# Patient Record
Sex: FEMALE | Race: WHITE | Employment: FULL TIME | URBAN - METROPOLITAN AREA
[De-identification: names, ages, dates, MRNs, and addresses within clinical notes are randomized per-mention and may not be internally consistent; named-entity substitution may affect disease eponyms.]

---

## 2017-04-05 ENCOUNTER — HOSPITAL ENCOUNTER (OUTPATIENT)
Dept: MAMMOGRAPHY | Age: 57
Discharge: HOME | End: 2017-04-05
Attending: FAMILY MEDICINE
Payer: COMMERCIAL

## 2017-04-05 DIAGNOSIS — Z12.31 VISIT FOR SCREENING MAMMOGRAM: ICD-10-CM

## 2017-04-05 PROCEDURE — 77067 SCR MAMMO BI INCL CAD: CPT

## 2018-04-06 ENCOUNTER — HOSPITAL ENCOUNTER (OUTPATIENT)
Dept: MAMMOGRAPHY | Age: 58
Discharge: HOME | End: 2018-04-06
Attending: FAMILY MEDICINE
Payer: COMMERCIAL

## 2018-04-06 DIAGNOSIS — Z12.31 ENCOUNTER FOR SCREENING MAMMOGRAM FOR BREAST CANCER: ICD-10-CM

## 2018-04-06 PROCEDURE — 77067 SCR MAMMO BI INCL CAD: CPT

## 2018-06-21 ENCOUNTER — APPOINTMENT (OUTPATIENT)
Dept: ULTRASOUND IMAGING | Age: 58
End: 2018-06-21
Attending: EMERGENCY MEDICINE
Payer: COMMERCIAL

## 2018-06-21 ENCOUNTER — HOSPITAL ENCOUNTER (EMERGENCY)
Age: 58
Discharge: HOME | End: 2018-06-21
Attending: EMERGENCY MEDICINE
Payer: COMMERCIAL

## 2018-06-21 VITALS
DIASTOLIC BLOOD PRESSURE: 70 MMHG | OXYGEN SATURATION: 99 % | HEART RATE: 89 BPM | WEIGHT: 172 LBS | BODY MASS INDEX: 32.47 KG/M2 | TEMPERATURE: 98 F | SYSTOLIC BLOOD PRESSURE: 130 MMHG | RESPIRATION RATE: 16 BRPM | HEIGHT: 61 IN

## 2018-06-21 DIAGNOSIS — M79.631 RIGHT FOREARM PAIN: Primary | ICD-10-CM

## 2018-06-21 PROCEDURE — 99283 EMERGENCY DEPT VISIT LOW MDM: CPT | Performed by: EMERGENCY MEDICINE

## 2018-06-21 PROCEDURE — 93971 EXTREMITY STUDY: CPT

## 2018-06-21 NOTE — ED TRIAGE NOTES
Pt reports she wants to make sure she does not have a blood clot in her right arm. Pt reports hx of blood clots in each leg.  Radial pulse present in traige

## 2018-06-21 NOTE — ED NOTES
I have reviewed discharge instructions with the patient. The patient verbalized understanding. Patient left ED via Discharge Method: ambulatory to Home with family. Opportunity for questions and clarification provided. Patient given 0 scripts. To continue your aftercare when you leave the hospital, you may receive an automated call from our care team to check in on how you are doing. This is a free service and part of our promise to provide the best care and service to meet your aftercare needs.  If you have questions, or wish to unsubscribe from this service please call [phone]. Thank you for Choosing our [facility] Emergency Department.

## 2018-06-21 NOTE — DISCHARGE INSTRUCTIONS
Alternate 4 ibuprofen every 8 hours with 2 extra-strength tylenol every 6 hours       Lipoma: Care Instructions  Your Care Instructions  A lipoma is a growth of fat just below the skin. It may feel soft and rubbery. Lipomas can occur anywhere on the body. But they are most common on the torso, neck, upper thighs, upper arms, and armpits. A lipoma does not turn into cancer. Lipomas usually are not treated, because most of them don't hurt or cause problems. But your doctor may remove a lipoma if it is painful, gets infected, or bothers you. Follow-up care is a key part of your treatment and safety. Be sure to make and go to all appointments, and call your doctor if you are having problems. It's also a good idea to know your test results and keep a list of the medicines you take. How can you care for yourself at home? · Lipomas usually need no care at home. · If your doctor removes a lipoma, follow directions for taking care of the cut (incision). When should you call for help? Call your doctor now or seek immediate medical care if:  ? · You have signs of infection, such as:  ¨ Increased pain, swelling, warmth, or redness. ¨ Red streaks leading from the lipoma. ¨ Pus draining from the lipoma. ¨ A fever. ? Watch closely for changes in your health, and be sure to contact your doctor if:  ? · The lipoma is growing or changing. ? · You do not get better as expected. Where can you learn more? Go to http://lily-dustin.info/. Enter L924 in the search box to learn more about \"Lipoma: Care Instructions. \"  Current as of: October 13, 2016  Content Version: 11.4  © 5445-5555 [employer]. Care instructions adapted under license by [employer] (which disclaims liability or warranty for this information).  If you have questions about a medical condition or this instruction, always ask your healthcare professional. Matt Collazo disclaims any warranty or liability for your use of this information.

## 2018-06-21 NOTE — ED PROVIDER NOTES
Patient is a 62 y.o. female presenting with arm pain. The history is provided by the patient and the spouse. Arm Pain    This is a new problem. The current episode started more than 2 days ago. The problem occurs constantly. The problem has been gradually worsening. Pain location: left volar forearm. The quality of the pain is described as aching. The pain is mild. Pertinent negatives include no numbness, full range of motion, no stiffness, no tingling and no neck pain. The symptoms are aggravated by palpation. She has tried nothing for the symptoms. The treatment provided no relief. There has been no history of extremity trauma (Not that she recalls).         Past Medical History:   Diagnosis Date    Abnormal CXR 1/14/13    Allergic rhinitis     Dizziness     DVT [facility]deep venous thrombosis) ([facility]) 1976    LLE    Dyspnea     Fatigue     Frequency     GERD (gastroesophageal reflux disease)     controlled with med    Guillain Barré syndrome ([facility] [address] 08/22/2016    onset 1976    Hair loss     Hot flashes     Hyperlipidemia     Hypothyroidism     Insomnia     Irregular menstruation     Leg swelling     Lumbago     Neck pain     Night sweats     Obesity     ROME (obstructive sleep apnea)     pt says she has not been tested- dr says she has it    Osteopenia     Osteopenia     Other ill-defined conditions(799.89)     guillian mini    Pharyngitis     Plantar fasciitis     Postmenopausal disorder     Right lumbar radiculopathy     Sinusitis     Thromboembolus ([address] 2014    LLE    Tobacco abuse     URI (upper respiratory infection)     Venous stasis     Vitamin D deficiency     Vitamin D deficiency        Past Surgical History:   Procedure Laterality Date    BREAST SURGERY PROCEDURE UNLISTED Left     lumpectomy    COLONOSCOPY N/A 8/26/2016    COLONOSCOPY performed by Dee Collins [facility] at [facility] ENDOSCOPY    HX HEENT      tonsils    HX TRACHEOSTOMY      due to [place] Family History:   Problem Relation Age of Onset    Hypertension Other     Diabetes Other     Heart Disease Other     Kidney Disease Other     Stroke Other     High Cholesterol Other     Other Other      arthritis    Breast Cancer Mother [de-identified]    Hypertension Mother     Stroke Mother      \"several\"    Diabetes Mother      insulin dep    Colon Cancer Mother     Breast Cancer Maternal Aunt      in her 46s    Heart Disease Sister      on warfarin-eventually might need a pacemaker    Cancer Sister      lymphoma    Hypertension Sister     Breast Cancer Sister     Hypertension Sister        Social History     Social History    Marital status:      Spouse name: N/A    Number of children: N/A    Years of education: N/A     Occupational History    Not on file. Social History Main Topics    Smoking status: Former Smoker     Years: 35.00    Smokeless tobacco: Never Used      Comment: quit age 47    Alcohol use 3.6 oz/week     6 Cans of beer, 0 Standard drinks or equivalent per week      Comment: Drinks beer, amount not reviewed.  Drug use: No    Sexual activity: Not on file     Other Topics Concern    Not on file     Social History Narrative         ALLERGIES: Codeine; Flu vaccine 2011 (36 mos+)(pf); Nucynta [tapentadol]; and Topamax [topiramate]    Review of Systems   Constitutional: Negative for chills and fever. Musculoskeletal: Positive for myalgias. Negative for neck pain, neck stiffness and stiffness. Skin: Negative for color change and wound. Neurological: Negative for tingling, weakness and numbness. Hematological: Does not bruise/bleed easily. Vitals:    06/21/18 1643 06/21/18 1725   BP: (!) 146/91 128/78   Pulse: (!) 103    Resp: 16    Temp: 97.9 °F (36.6 °C)    SpO2: 97% 100%   Weight: 78 kg (172 lb)    Height: 5' 1\" (1.549 m)             Physical Exam   Constitutional: She is oriented to person, place, and time.  She appears well-developed and well-nourished. No distress. HENT:   Head: Normocephalic and atraumatic. Eyes: Conjunctivae and EOM are normal. Right eye exhibits no discharge. Left eye exhibits no discharge. Neck: Normal range of motion. Neck supple. Pulmonary/Chest: Effort normal. No respiratory distress. Musculoskeletal: Normal range of motion. She exhibits tenderness. Arms:  Neurological: She is alert and oriented to person, place, and time. She has normal strength. She exhibits normal muscle tone. cni 2-12 grossly  Nl gait,  Nl speech     Skin: Skin is warm and dry. No rash noted. She is not diaphoretic. Psychiatric: She has a normal mood and affect. Her behavior is normal.   Nursing note and vitals reviewed. MDM  Number of Diagnoses or Management Options  Diagnosis management comments: Medical decision making note:  Right arm pain with concern for DVT, patient has history of bilateral DVTs in the past though in the lower extremity. Perform Doppler ultrasound. This concludes the \"medical decision making note\" part of this emergency department visit note.           ED Course       Procedures

## 2018-08-20 PROBLEM — Z12.11 COLON CANCER SCREENING: Status: ACTIVE | Noted: 2018-08-20

## 2018-08-20 PROBLEM — F41.1 GENERALIZED ANXIETY DISORDER: Status: ACTIVE | Noted: 2018-05-16

## 2018-08-20 PROBLEM — Z86.718 HISTORY OF DVT (DEEP VEIN THROMBOSIS): Status: ACTIVE | Noted: 2018-08-20

## 2018-08-20 PROBLEM — Z80.0 FAMILY HISTORY OF COLON CANCER: Chronic | Status: ACTIVE | Noted: 2018-08-20

## 2018-08-20 PROBLEM — R60.9 PERIPHERAL EDEMA: Status: ACTIVE | Noted: 2018-08-20

## 2018-08-20 PROBLEM — K21.9 GASTROESOPHAGEAL REFLUX DISEASE WITHOUT ESOPHAGITIS: Status: ACTIVE | Noted: 2018-05-16

## 2018-08-20 PROBLEM — Z79.899 ENCOUNTER FOR LONG-TERM (CURRENT) USE OF MEDICATIONS: Chronic | Status: ACTIVE | Noted: 2018-08-20

## 2018-08-20 PROBLEM — Z80.0 FAMILY HISTORY OF COLON CANCER: Status: ACTIVE | Noted: 2018-08-20

## 2018-08-20 PROBLEM — R51.9 CHRONIC HEADACHE DISORDER: Status: ACTIVE | Noted: 2018-05-16

## 2018-08-20 PROBLEM — G89.29 CHRONIC HEADACHE DISORDER: Status: ACTIVE | Noted: 2018-05-16

## 2018-08-20 PROBLEM — Z79.899 ENCOUNTER FOR LONG-TERM (CURRENT) USE OF MEDICATIONS: Status: ACTIVE | Noted: 2018-08-20

## 2018-08-20 PROBLEM — L65.9 HAIR THINNING: Status: ACTIVE | Noted: 2018-08-20

## 2018-08-20 PROBLEM — N32.81 OVERACTIVE BLADDER: Status: ACTIVE | Noted: 2018-05-16

## 2018-08-20 PROBLEM — Z76.89 ESTABLISHING CARE WITH NEW DOCTOR, ENCOUNTER FOR: Status: ACTIVE | Noted: 2018-08-20

## 2018-08-20 PROBLEM — Z79.1 NSAID LONG-TERM USE: Status: ACTIVE | Noted: 2018-08-20

## 2019-04-08 PROBLEM — E55.9 VITAMIN D DEFICIENCY: Status: ACTIVE | Noted: 2019-04-08

## 2019-04-08 PROBLEM — E78.1 HIGH TRIGLYCERIDES: Status: ACTIVE | Noted: 2019-04-08

## 2019-04-08 PROBLEM — E66.9 OBESITY (BMI 30-39.9): Status: ACTIVE | Noted: 2019-04-08

## 2019-04-16 ENCOUNTER — HOSPITAL ENCOUNTER (OUTPATIENT)
Dept: MAMMOGRAPHY | Age: 59
Discharge: HOME | End: 2019-04-16
Attending: FAMILY MEDICINE
Payer: COMMERCIAL

## 2019-04-16 DIAGNOSIS — Z12.31 VISIT FOR SCREENING MAMMOGRAM: ICD-10-CM

## 2019-04-16 PROCEDURE — 77067 SCR MAMMO BI INCL CAD: CPT

## 2019-04-21 PROBLEM — G43.009 MIGRAINE WITHOUT AURA AND WITHOUT STATUS MIGRAINOSUS, NOT INTRACTABLE: Status: ACTIVE | Noted: 2019-04-21

## 2019-08-20 ENCOUNTER — HOSPITAL ENCOUNTER (OUTPATIENT)
Dept: GENERAL RADIOLOGY | Age: 59
Discharge: HOME | End: 2019-08-20
Payer: COMMERCIAL

## 2019-08-20 DIAGNOSIS — M54.2 NECK PAIN: ICD-10-CM

## 2019-08-20 PROCEDURE — 72040 X-RAY EXAM NECK SPINE 2-3 VW: CPT

## 2019-09-24 PROBLEM — Z12.11 COLON CANCER SCREENING: Status: RESOLVED | Noted: 2018-08-20 | Resolved: 2019-09-24

## 2020-08-26 ENCOUNTER — HOSPITAL ENCOUNTER (OUTPATIENT)
Dept: MAMMOGRAPHY | Age: 60
Discharge: HOME | End: 2020-08-26
Attending: FAMILY MEDICINE
Payer: COMMERCIAL

## 2020-08-26 DIAGNOSIS — Z12.31 VISIT FOR SCREENING MAMMOGRAM: ICD-10-CM

## 2020-08-26 PROCEDURE — 77063 BREAST TOMOSYNTHESIS BI: CPT

## 2021-10-11 ENCOUNTER — TRANSCRIBE ORDER (OUTPATIENT)
Dept: SCHEDULING | Age: 61
End: 2021-10-11

## 2021-10-11 DIAGNOSIS — Z12.31 SCREENING MAMMOGRAM FOR HIGH-RISK PATIENT: Primary | ICD-10-CM

## 2021-11-05 ENCOUNTER — HOSPITAL ENCOUNTER (OUTPATIENT)
Dept: MAMMOGRAPHY | Age: 61
Discharge: HOME | End: 2021-11-05
Attending: NURSE PRACTITIONER
Payer: COMMERCIAL

## 2021-11-05 DIAGNOSIS — Z12.31 SCREENING MAMMOGRAM FOR HIGH-RISK PATIENT: ICD-10-CM

## 2021-11-05 PROCEDURE — 77063 BREAST TOMOSYNTHESIS BI: CPT

## 2022-01-25 PROBLEM — Z80.0 FAMILY HISTORY OF COLON CANCER REQUIRING SCREENING COLONOSCOPY: Status: ACTIVE | Noted: 2022-01-25

## 2022-03-18 PROBLEM — E66.9 OBESITY (BMI 30-39.9): Status: ACTIVE | Noted: 2019-04-08

## 2022-03-18 PROBLEM — Z80.0 FAMILY HISTORY OF COLON CANCER REQUIRING SCREENING COLONOSCOPY: Status: ACTIVE | Noted: 2022-01-25

## 2022-03-18 PROBLEM — E78.1 HIGH TRIGLYCERIDES: Status: ACTIVE | Noted: 2019-04-08

## 2022-03-19 PROBLEM — K21.9 GASTROESOPHAGEAL REFLUX DISEASE WITHOUT ESOPHAGITIS: Status: ACTIVE | Noted: 2018-05-16

## 2022-03-19 PROBLEM — Z79.899 ENCOUNTER FOR LONG-TERM (CURRENT) USE OF MEDICATIONS: Status: ACTIVE | Noted: 2018-08-20

## 2022-03-19 PROBLEM — N32.81 OVERACTIVE BLADDER: Status: ACTIVE | Noted: 2018-05-16

## 2022-03-19 PROBLEM — L65.9 HAIR THINNING: Status: ACTIVE | Noted: 2018-08-20

## 2022-03-19 PROBLEM — F41.1 GENERALIZED ANXIETY DISORDER: Status: ACTIVE | Noted: 2018-05-16

## 2022-03-19 PROBLEM — Z79.1 NSAID LONG-TERM USE: Status: ACTIVE | Noted: 2018-08-20

## 2022-03-19 PROBLEM — Z76.89 ESTABLISHING CARE WITH NEW DOCTOR, ENCOUNTER FOR: Status: ACTIVE | Noted: 2018-08-20

## 2022-03-19 PROBLEM — E55.9 VITAMIN D DEFICIENCY: Status: ACTIVE | Noted: 2019-04-08

## 2022-03-20 PROBLEM — G43.009 MIGRAINE WITHOUT AURA AND WITHOUT STATUS MIGRAINOSUS, NOT INTRACTABLE: Status: ACTIVE | Noted: 2019-04-21

## 2022-03-20 PROBLEM — G89.29 CHRONIC HEADACHE DISORDER: Status: ACTIVE | Noted: 2018-05-16

## 2022-03-20 PROBLEM — Z86.718 HISTORY OF DVT (DEEP VEIN THROMBOSIS): Status: ACTIVE | Noted: 2018-08-20

## 2022-03-20 PROBLEM — Z80.0 FAMILY HISTORY OF COLON CANCER: Status: ACTIVE | Noted: 2018-08-20

## 2022-03-20 PROBLEM — R60.9 PERIPHERAL EDEMA: Status: ACTIVE | Noted: 2018-08-20

## 2022-03-20 PROBLEM — R51.9 CHRONIC HEADACHE DISORDER: Status: ACTIVE | Noted: 2018-05-16

## 2022-03-20 PROBLEM — R60.0 PERIPHERAL EDEMA: Status: ACTIVE | Noted: 2018-08-20

## 2022-06-06 ENCOUNTER — PREP FOR PROCEDURE (OUTPATIENT)
Dept: SURGERY | Age: 62
End: 2022-06-06

## 2022-06-06 RX ORDER — SODIUM CHLORIDE 9 MG/ML
INJECTION, SOLUTION INTRAVENOUS PRN
Status: CANCELLED | OUTPATIENT
Start: 2022-06-06

## 2022-06-06 RX ORDER — SODIUM CHLORIDE 0.9 % (FLUSH) 0.9 %
5-40 SYRINGE (ML) INJECTION EVERY 12 HOURS SCHEDULED
Status: CANCELLED | OUTPATIENT
Start: 2022-06-06

## 2022-06-06 RX ORDER — SODIUM CHLORIDE 0.9 % (FLUSH) 0.9 %
5-40 SYRINGE (ML) INJECTION PRN
Status: CANCELLED | OUTPATIENT
Start: 2022-06-06

## 2022-06-22 RX ORDER — DARIFENACIN HYDROBROMIDE 15 MG/1
TABLET, EXTENDED RELEASE ORAL
Qty: 90 TABLET | OUTPATIENT
Start: 2022-06-22

## 2022-06-27 RX ORDER — OMEPRAZOLE 20 MG/1
CAPSULE, DELAYED RELEASE ORAL
Qty: 30 CAPSULE | Refills: 0 | OUTPATIENT
Start: 2022-06-27

## 2022-06-27 RX ORDER — OMEPRAZOLE 20 MG/1
CAPSULE, DELAYED RELEASE ORAL
Qty: 90 CAPSULE | OUTPATIENT
Start: 2022-06-27

## 2022-06-27 RX ORDER — LEVOTHYROXINE SODIUM 0.1 MG/1
TABLET ORAL
Qty: 90 TABLET | OUTPATIENT
Start: 2022-06-27

## 2022-06-27 RX ORDER — LEVOTHYROXINE SODIUM 0.1 MG/1
100 TABLET ORAL
Qty: 30 TABLET | Refills: 0 | OUTPATIENT
Start: 2022-06-27

## 2022-07-01 RX ORDER — LEVOTHYROXINE SODIUM 137 UG/1
TABLET ORAL
COMMUNITY
Start: 2022-05-23

## 2022-07-01 RX ORDER — CLOTRIMAZOLE AND BETAMETHASONE DIPROPIONATE 10; .64 MG/G; MG/G
CREAM TOPICAL
COMMUNITY
Start: 2022-06-16

## 2022-07-01 NOTE — PERIOP NOTE
Dear Mrs. Mata,      Thank you for completing your phone assessment with me today. Here are your requested procedure instructions. Please call #[phone] with any questions/concerns. Your procedure is scheduled at [address] [facility], [place], [address]. Please arrive at [facility]; GI Lab (#[phone]) will call you on the business day before your surgery with your arrival time. If you have any questions on the day of procedure, please call the GI dept. at the telephone number above. Follow procedure instructions for nothing by mouth or colonoscopy prep received from the GI/Surgeon office. Please take these medications on the morning of the procedure with a small sip of water: xanax, ASA 81 mg, enablex, levothyroxine, omeprazole; . Please stop all vitamins/supplements 7 days prior to the procedure and stop all NSAIDS (ibuprofen, naproxen, aleve, motrin, advil) 5 days before your procedure. A responsible adult must drive you to the hospital, remain in the building during the procedure and you will need adult supervision for 24 hours after anesthesia. Please use a non-moisturizing soap the night before the procedure and on the morning of the procedure. Do NOT wear: make-up, nail polish, lotions, cologne, perfumes, powders or oil on your skin. All piercings/metal/jewelry must be removed prior to arrival.  If you wear contacts then you will need to bring a case to store them in or wear your glasses. Deodorant is acceptable with all EGDs and/or colonoscopies. Medication given during procedure may cause drowsiness for several hours, therefore, do not drive or operate machinery for remainder of the day. You may not drink alcohol on the day of your procedure, please resume regular diet and activity unless otherwise directed. You may experience abdominal distention for several hours that is relieved by the passage of gas.  Contact your physician if you have any of the following: fever or chills, severe abdominal pain or excessive amount of bleeding or a large amount when having a bowel movement. Occasional specks of blood with bowel movement would not be unusual.     Please leave all your valuables at home but be sure to bring your insurance card and ID on the day of surgery for registration/identification. Our Guide to Surgery with additional information can be found:  [email or web address]    Emailed to: [email or web address]

## 2022-07-01 NOTE — PERIOP NOTE
Patient verified name, , and procedure. Type: 1a; abbreviated assessment per anesthesia guidelines    Labs per anesthesia: None per protocol    Instructed pt that they will be notified the day before their procedure by the GI Lab for time of arrival [facility] for [place] cases. Arrival times should be called by 5 pm. If no phone is received the patient should contact their respective hospital. The GI lab telephone number is [phone] and ES Pre-op is [phone]. Follow diet and prep instructions per office including NPO status. If patient has NOT received instructions from office patient is advised to call surgeon office, verbalizes understanding. Bath or shower the night before and the am of surgery with non-mositurizing soap. No lotions, oils, powders, cologne on skin. No make up, eye make up or jewelry. Wear loose fitting comfortable, clean clothing. Must have adult present in building the entire time . Medications for the day of procedure: xanax, ASA 81 mg, enablex, levothyroxine, omeprazole;  patient to hold: lasix    The following discharge instructions reviewed with patient: medication given during procedure may cause drowsiness for several hours, therefore, do not drive or operate machinery for remainder of the day. You may not drink alcohol on the day of your procedure, please resume regular diet and activity unless otherwise directed. You may experience abdominal distention for several hours that is relieved by the passage of gas. Contact your physician if you have any of the following: fever or chills, severe abdominal pain or excessive amount of bleeding or a large amount when having a bowel movement.  Occasional specks of blood with bowel movement would not be unusual.

## 2022-07-05 ENCOUNTER — ANESTHESIA EVENT (OUTPATIENT)
Dept: ENDOSCOPY | Age: 62
End: 2022-07-05
Payer: COMMERCIAL

## 2022-07-05 RX ORDER — DEXTROSE MONOHYDRATE 50 MG/ML
100 INJECTION, SOLUTION INTRAVENOUS PRN
Status: CANCELLED | OUTPATIENT
Start: 2022-07-05

## 2022-07-05 RX ORDER — HYDROMORPHONE HYDROCHLORIDE 2 MG/ML
0.25 INJECTION, SOLUTION INTRAMUSCULAR; INTRAVENOUS; SUBCUTANEOUS EVERY 5 MIN PRN
Status: CANCELLED | OUTPATIENT
Start: 2022-07-05

## 2022-07-05 RX ORDER — OXYCODONE HYDROCHLORIDE 5 MG/1
10 TABLET ORAL PRN
Status: CANCELLED | OUTPATIENT
Start: 2022-07-05 | End: 2022-07-05

## 2022-07-05 RX ORDER — LABETALOL HYDROCHLORIDE 5 MG/ML
10 INJECTION, SOLUTION INTRAVENOUS
Status: CANCELLED | OUTPATIENT
Start: 2022-07-05

## 2022-07-05 RX ORDER — OXYCODONE HYDROCHLORIDE 5 MG/1
5 TABLET ORAL PRN
Status: CANCELLED | OUTPATIENT
Start: 2022-07-05 | End: 2022-07-05

## 2022-07-05 RX ORDER — HYDROMORPHONE HYDROCHLORIDE 2 MG/ML
0.5 INJECTION, SOLUTION INTRAMUSCULAR; INTRAVENOUS; SUBCUTANEOUS EVERY 5 MIN PRN
Status: CANCELLED | OUTPATIENT
Start: 2022-07-05

## 2022-07-05 RX ORDER — ONDANSETRON 2 MG/ML
4 INJECTION INTRAMUSCULAR; INTRAVENOUS
Status: CANCELLED | OUTPATIENT
Start: 2022-07-05 | End: 2022-07-05

## 2022-07-05 RX ORDER — GLUCAGON 1 MG/ML
1 KIT INJECTION PRN
Status: CANCELLED | OUTPATIENT
Start: 2022-07-05

## 2022-07-06 ENCOUNTER — ANESTHESIA (OUTPATIENT)
Dept: ENDOSCOPY | Age: 62
End: 2022-07-06
Payer: COMMERCIAL

## 2022-07-06 ENCOUNTER — HOSPITAL ENCOUNTER (OUTPATIENT)
Age: 62
Setting detail: OUTPATIENT SURGERY
Discharge: HOME | End: 2022-07-06
Attending: SURGERY | Admitting: SURGERY
Payer: COMMERCIAL

## 2022-07-06 VITALS
HEART RATE: 81 BPM | WEIGHT: 196 LBS | SYSTOLIC BLOOD PRESSURE: 141 MMHG | DIASTOLIC BLOOD PRESSURE: 79 MMHG | RESPIRATION RATE: 18 BRPM | OXYGEN SATURATION: 100 % | BODY MASS INDEX: 37 KG/M2 | HEIGHT: 61 IN | TEMPERATURE: 97.7 F

## 2022-07-06 PROCEDURE — 7100000010 HC PHASE II RECOVERY - FIRST 15 MIN: Performed by: SURGERY

## 2022-07-06 PROCEDURE — 3609027000 HC COLONOSCOPY: Performed by: SURGERY

## 2022-07-06 PROCEDURE — 2709999900 HC NON-CHARGEABLE SUPPLY: Performed by: SURGERY

## 2022-07-06 PROCEDURE — 2580000003 HC RX 258: Performed by: ANESTHESIOLOGY

## 2022-07-06 PROCEDURE — 2500000003 HC RX 250 WO HCPCS: Performed by: NURSE ANESTHETIST, CERTIFIED REGISTERED

## 2022-07-06 PROCEDURE — 3700000000 HC ANESTHESIA ATTENDED CARE: Performed by: SURGERY

## 2022-07-06 PROCEDURE — 45378 DIAGNOSTIC COLONOSCOPY: CPT | Performed by: SURGERY

## 2022-07-06 PROCEDURE — 7100000011 HC PHASE II RECOVERY - ADDTL 15 MIN: Performed by: SURGERY

## 2022-07-06 PROCEDURE — 3700000001 HC ADD 15 MINUTES (ANESTHESIA): Performed by: SURGERY

## 2022-07-06 PROCEDURE — 6360000002 HC RX W HCPCS: Performed by: NURSE ANESTHETIST, CERTIFIED REGISTERED

## 2022-07-06 RX ORDER — TRISODIUM CITRATE DIHYDRATE AND CITRIC ACID MONOHYDRATE 500; 334 MG/5ML; MG/5ML
30 SOLUTION ORAL
Status: DISCONTINUED | OUTPATIENT
Start: 2022-07-06 | End: 2022-07-06 | Stop reason: HOSPADM

## 2022-07-06 RX ORDER — LIDOCAINE HYDROCHLORIDE 20 MG/ML
INJECTION, SOLUTION EPIDURAL; INFILTRATION; INTRACAUDAL; PERINEURAL PRN
Status: DISCONTINUED | OUTPATIENT
Start: 2022-07-06 | End: 2022-07-06 | Stop reason: SDUPTHER

## 2022-07-06 RX ORDER — PROPOFOL 10 MG/ML
INJECTION, EMULSION INTRAVENOUS PRN
Status: DISCONTINUED | OUTPATIENT
Start: 2022-07-06 | End: 2022-07-06 | Stop reason: SDUPTHER

## 2022-07-06 RX ORDER — SODIUM CHLORIDE 0.9 % (FLUSH) 0.9 %
5-40 SYRINGE (ML) INJECTION PRN
Status: DISCONTINUED | OUTPATIENT
Start: 2022-07-06 | End: 2022-07-06 | Stop reason: HOSPADM

## 2022-07-06 RX ORDER — PROPOFOL 10 MG/ML
INJECTION, EMULSION INTRAVENOUS CONTINUOUS PRN
Status: DISCONTINUED | OUTPATIENT
Start: 2022-07-06 | End: 2022-07-06 | Stop reason: SDUPTHER

## 2022-07-06 RX ORDER — MIDAZOLAM HYDROCHLORIDE 2 MG/2ML
2 INJECTION, SOLUTION INTRAMUSCULAR; INTRAVENOUS
Status: DISCONTINUED | OUTPATIENT
Start: 2022-07-06 | End: 2022-07-06 | Stop reason: HOSPADM

## 2022-07-06 RX ORDER — SODIUM CHLORIDE, SODIUM LACTATE, POTASSIUM CHLORIDE, CALCIUM CHLORIDE 600; 310; 30; 20 MG/100ML; MG/100ML; MG/100ML; MG/100ML
INJECTION, SOLUTION INTRAVENOUS CONTINUOUS
Status: DISCONTINUED | OUTPATIENT
Start: 2022-07-06 | End: 2022-07-06 | Stop reason: HOSPADM

## 2022-07-06 RX ORDER — SODIUM CHLORIDE 0.9 % (FLUSH) 0.9 %
5-40 SYRINGE (ML) INJECTION EVERY 12 HOURS SCHEDULED
Status: DISCONTINUED | OUTPATIENT
Start: 2022-07-06 | End: 2022-07-06 | Stop reason: HOSPADM

## 2022-07-06 RX ORDER — LIDOCAINE HYDROCHLORIDE 10 MG/ML
1 INJECTION, SOLUTION EPIDURAL; INFILTRATION; INTRACAUDAL; PERINEURAL
Status: DISCONTINUED | OUTPATIENT
Start: 2022-07-06 | End: 2022-07-06 | Stop reason: HOSPADM

## 2022-07-06 RX ORDER — SODIUM CHLORIDE 9 MG/ML
INJECTION, SOLUTION INTRAVENOUS PRN
Status: DISCONTINUED | OUTPATIENT
Start: 2022-07-06 | End: 2022-07-06 | Stop reason: HOSPADM

## 2022-07-06 RX ADMIN — PROPOFOL 140 MCG/KG/MIN: 10 INJECTION, EMULSION INTRAVENOUS at 09:06

## 2022-07-06 RX ADMIN — PROPOFOL 50 MG: 10 INJECTION, EMULSION INTRAVENOUS at 09:06

## 2022-07-06 RX ADMIN — PROPOFOL 40 MG: 10 INJECTION, EMULSION INTRAVENOUS at 09:11

## 2022-07-06 RX ADMIN — SODIUM CHLORIDE, POTASSIUM CHLORIDE, SODIUM LACTATE AND CALCIUM CHLORIDE: 600; 310; 30; 20 INJECTION, SOLUTION INTRAVENOUS at 08:08

## 2022-07-06 RX ADMIN — LIDOCAINE HYDROCHLORIDE 40 MG: 20 INJECTION, SOLUTION EPIDURAL; INFILTRATION; INTRACAUDAL; PERINEURAL at 09:05

## 2022-07-06 ASSESSMENT — PAIN - FUNCTIONAL ASSESSMENT
PAIN_FUNCTIONAL_ASSESSMENT: 0-10
PAIN_FUNCTIONAL_ASSESSMENT: NONE - DENIES PAIN
PAIN_FUNCTIONAL_ASSESSMENT: 0-10
PAIN_FUNCTIONAL_ASSESSMENT: NONE - DENIES PAIN
PAIN_FUNCTIONAL_ASSESSMENT: NONE - DENIES PAIN

## 2022-07-06 NOTE — BRIEF OP NOTE
Brief Postoperative Note      Patient: Haile King  YOB: 1960  MRN: [id number]    Date of Procedure: 7/6/2022    Pre-Op Diagnosis: z12.11    Post-Op Diagnosis: Normal exam       Procedure(s):  COLORECTAL CANCER SCREENING, NOT HIGH RISK    Surgeon(s):  Alexandra Nair MD    Assistant:  Surgical Assistant: Yocasta Harris    Anesthesia: Monitor Anesthesia Care    Estimated Blood Loss (mL): Minimal    Complications: None    Specimens:   * No specimens in log *    Implants:  * No implants in log *      Drains: * No LDAs found *    Findings: as above    Electronically signed by Francesca Muñoz MD on 7/6/2022 at 9:28 AM

## 2022-07-06 NOTE — ANESTHESIA POSTPROCEDURE EVALUATION
Department of Anesthesiology  Postprocedure Note    Patient: Sadiq Davis  MRN: [id number]  YOB: 1960  Date of evaluation: 7/6/2022      Procedure Summary     Date: 07/06/22 Room / Location: [facility] ENDO 04 / [facility] ENDOSCOPY    Anesthesia Start: 4710 Anesthesia Stop: 0930    Procedure: COLORECTAL CANCER SCREENING, NOT HIGH RISK (N/A Lower GI Region) Diagnosis: (z12.11)    Surgeons: Josselin Rod MD Responsible Provider: Natalie Agustin MD    Anesthesia Type: TIVA ASA Status: 3          Anesthesia Type: No value filed.     Etvin Phase I: Tevin Score: 10    Tevin Phase II:        Anesthesia Post Evaluation    Patient location during evaluation: PACU  Patient participation: complete - patient participated  Level of consciousness: awake and alert  Airway patency: patent  Nausea & Vomiting: no nausea and no vomiting  Complications: no  Cardiovascular status: hemodynamically stable  Respiratory status: acceptable, nonlabored ventilation and spontaneous ventilation  Hydration status: euvolemic  Comments: /87   Pulse 80   Temp 97.7 °F (36.5 °C) (Skin)   Resp 18   Ht 5' 1\" (1.549 m)   Wt 196 lb (88.9 kg)   SpO2 99%   BMI 37.03 kg/m²     Multimodal analgesia pain management approach

## 2022-07-06 NOTE — OP NOTE
Operative Note      Patient: Navdeep Leong  YOB: 1960  MRN: [id number]    Date of Procedure: 7/6/2022    Pre-Op Diagnosis: z12.11    Post-Op Diagnosis: Normal exam       Procedure(s):  COLORECTAL CANCER SCREENING, NOT HIGH RISK    Surgeon(s):  Jules Veliz MD    Assistant:   Surgical Assistant: John Mckenna    Anesthesia: Monitor Anesthesia Care    Estimated Blood Loss (mL): Minimal    Complications: None    Specimens:   * No specimens in log *    Implants:  * No implants in log *      Drains: * No LDAs found *    Findings: as above    Detailed Description of Procedure:   Dictated  TOV#[id number]    Electronically signed by Sagar Hernandez MD on 7/6/2022 at 9:29 AM

## 2022-07-06 NOTE — ANESTHESIA PRE PROCEDURE
Department of Anesthesiology  Preprocedure Note       Name:  Lenny Simon   Age:  64 y.o.  :  1960                                          MRN:  [id number]         Date:  2022      Surgeon: Sunitha Robb):  Anthony Webb MD    Procedure: Procedure(s):  COLORECTAL CANCER SCREENING, NOT HIGH RISK    Medications prior to admission:   Prior to Admission medications    Medication Sig Start Date End Date Taking? Authorizing Provider   Multiple Vitamin (MULTIVITAMIN ADULT PO) Take by mouth    Historical Provider, MD   clotrimazole-betamethasone (LOTRISONE) 1-0.05 % cream  22   Historical Provider, MD   levothyroxine (SYNTHROID) 137 MCG tablet  22   Historical Provider, MD   ALPRAZolam Clarisse Close) 0.5 MG tablet Take 0.5-1 mg by mouth 3 times daily as needed.   20   Ar Automatic Reconciliation   aspirin 81 MG EC tablet Take 81 mg by mouth daily Preventative for blood clots in legs    Ar Automatic Reconciliation   atorvastatin (LIPITOR) 40 MG tablet Take 40 mg by mouth at bedtime  10/13/21   Ar Automatic Reconciliation   Calcium Carbonate Antacid 1000 MG CHEW Take 2 tablets by mouth as needed  17   Ar Automatic Reconciliation   Cholecalciferol 50 MCG (2000 UT) CAPS Take 2,000 Units by mouth daily 22   Ar Automatic Reconciliation   cyanocobalamin 1000 MCG tablet Take 1,000 mcg by mouth daily    Ar Automatic Reconciliation   cyclobenzaprine (FLEXERIL) 10 MG tablet Take 10 mg by mouth 2 times daily as needed  21   Ar Automatic Reconciliation   darifenacin (ENABLEX) 15 MG extended release tablet Take 15 mg by mouth daily 22   Ar Automatic Reconciliation   ergocalciferol (ERGOCALCIFEROL) 1.25 MG (06905 UT) capsule TAKE 1 CAPSULE BY MOUTH EVERY WEEK  Patient not taking: Reported on 2022   Ar Automatic Reconciliation   furosemide (LASIX) 20 MG tablet Take 1 tablet by mouth as needed     Ar Automatic Reconciliation   Lifitegrast (XIIDRA) 5 % SOLN INSTILL 1 DROP IN BOTH EYES TWICE DAILY 12 HOURS APART 8/31/21   Ar Automatic Reconciliation   loratadine (CLARITIN) 10 MG tablet Take 10 mg by mouth    Ar Automatic Reconciliation   omeprazole (PRILOSEC) 20 MG delayed release capsule TAKE ONE CAPSULE BY MOUTH EVERY MORNING 2/1/21   Ar Automatic Reconciliation   phentermine (ADIPEX-P) 37.5 MG tablet Take 37.5 mg by mouth. Patient not taking: Reported on 7/1/2022 2/1/21   Ar Automatic Reconciliation   topiramate (TOPAMAX) 50 MG tablet Take 50 mg by mouth 2 times daily  Patient not taking: Reported on 7/1/2022    Ar Automatic Reconciliation       Current medications:    No current facility-administered medications for this encounter. Allergies: Allergies   Allergen Reactions    Latex Hives    Codeine Hives and Nausea Only     Codeine Sulfate   Even taking phenergan as premedication it didn't help    Other Other (See Comments)     Pt has Guillain [place]    Tapentadol Nausea Only     Nucynta     Topiramate Other (See Comments)     Tingling all over       Problem List:    Patient Active Problem List   Diagnosis Code    High triglycerides E78.1    Obesity (BMI 30-39. 9) E66.9    HEATH (obstructive sleep apnea) G47.33    Hyperlipidemia E78.5    Allergic rhinitis J30.9    Insomnia G47.00    Family history of colon cancer requiring screening colonoscopy Z80.0    Neck pain M54.2    Hypothyroidism E03.9    Generalized anxiety disorder F41.1    History of Guillain-[place] syndrome Z86.69    Vitamin D deficiency E55.9    Overactive bladder N32.81    NSAID long-term use Z79.1    Hair thinning L65.9    Encounter for long-term (current) use of medications Z79.899    Chronic gastritis K29.50    Hot flashes R23.2    Gastroesophageal reflux disease without esophagitis K21.9    Osteopenia M85.80    GERD (gastroesophageal reflux disease) K21.9    Postmenopausal disorder N95.1    Right lumbar radiculopathy M54.16    Establishing care with new doctor, encounter for Z76.89   Jessa Peripheral edema R60.9    Migraine without aura and without status migrainosus, not intractable G43.009    Chronic headache disorder R51.9, G89.29    History of DVT (deep vein thrombosis) Z86.718    Family history of colon cancer Z80.0       Past Medical History:        Diagnosis Date    Abnormal CXR 2013    Allergic rhinitis     Arthritis     back and hands     Chronic pain     Depression     Dizziness     [facility]     LLE    Dyspnea     not right now 2022 per patient    Fatigue     Frequency     GERD (gastroesophageal reflux disease)     controlled with med    Guillain Barré syndrome ([address] 2016    onset     Hair loss     Headache     History of DVT (deep vein thrombosis) 2018    History of Guillain-Friend syndrome     was paralyzed x year; had to learn to walk again    Hot flashes     Hx of blood clots     Hyperlipidemia     Hypothyroidism     Insomnia     Irregular menstruation     Leg swelling     Lumbago     Neck pain     Night sweats     Obesity     HEATH (obstructive sleep apnea)     pt says she has not been tested- dr says she has it - no cpap machine used    Osteopenia     Osteopenia     Other ill-defined conditions(799.89)     guillian pooja    Pharyngitis     Plantar fasciitis     Postmenopausal disorder     Right lumbar radiculopathy     Sinusitis     Thromboembolus ([facility] [address]     LLE    Tobacco abuse     quit    URI (upper respiratory infection)     Venous stasis     Vitamin D deficiency        Past Surgical History:        Procedure Laterality Date    BREAST BIOPSY Left     BREAST SURGERY Left     lumpectomy    HEENT      tonsils    TRACHEOSTOMY      due to [address]         Social History:    Social History     Tobacco Use    Smoking status: Former Smoker     Quit date:      Years since quittin.5    Smokeless tobacco: Never Used    Tobacco comment: Quit smoking: quit age 47   Substance Use Topics    Alcohol use: Yes     Alcohol/week: 2.0 standard drinks     Types: 2 Cans of beer per week                                Counseling given: Not Answered  Comment: Quit smoking: quit age 47      Vital Signs (Current):   Vitals:    07/01/22 1051   Weight: 197 lb (89.4 kg)   Height: 5' 1\" (1.549 m)                                              BP Readings from Last 3 Encounters:   01/25/22 (!) 140/90   10/13/21 133/86       NPO Status:                                                                                 BMI:   Wt Readings from Last 3 Encounters:   07/01/22 197 lb (89.4 kg)   01/25/22 192 lb (87.1 kg)   10/13/21 192 lb 8 oz (87.3 kg)     Body mass index is 37.22 kg/m². CBC:   Lab Results   Component Value Date/Time    WBC 8.8 10/13/2021 02:11 PM    RBC 4.57 10/13/2021 02:11 PM    HGB 14.3 10/13/2021 02:11 PM    HCT 43.0 10/13/2021 02:11 PM    MCV 94 10/13/2021 02:11 PM    RDW 12.2 10/13/2021 02:11 PM     10/13/2021 02:11 PM       CMP:   Lab Results   Component Value Date/Time     10/13/2021 02:11 PM    K 4.9 10/13/2021 02:11 PM     10/13/2021 02:11 PM    CO2 25 10/13/2021 02:11 PM    BUN 11 10/13/2021 02:11 PM    CREATININE 0.69 10/13/2021 02:11 PM    GFRAA 109 10/13/2021 02:11 PM    AGRATIO 1.6 10/13/2021 02:11 PM    GLUCOSE 82 10/13/2021 02:11 PM    PROT 6.6 10/13/2021 02:11 PM    CALCIUM 9.4 10/13/2021 02:11 PM    BILITOT 0.3 10/13/2021 02:11 PM    ALKPHOS 118 10/13/2021 02:11 PM    AST 18 10/13/2021 02:11 PM    ALT 26 10/13/2021 02:11 PM       POC Tests: No results for input(s): POCGLU, POCNA, POCK, POCCL, POCBUN, POCHEMO, POCHCT in the last 72 hours.     Coags: No results found for: PROTIME, INR, APTT    HCG (If Applicable): No results found for: PREGTESTUR, PREGSERUM, HCG, HCGQUANT     ABGs: No results found for: PHART, PO2ART, DUD1IFW, MKV1GZD, BEART, A7ELAANZ     Type & Screen (If Applicable):  No results found for: LABABO, [address]    Drug/Infectious Status (If Applicable):  No results found for: HIV, HEPCAB    COVID-19 Screening (If Applicable): No results found for: COVID19        Anesthesia Evaluation  Patient summary reviewed and Nursing notes reviewed  Airway: Mallampati: II  TM distance: >3 FB   Neck ROM: full  Mouth opening: > = 3 FB   Dental: normal exam         Pulmonary: breath sounds clear to auscultation  (+) sleep apnea:                             Cardiovascular:  Exercise tolerance: good (>4 METS),   (+) hyperlipidemia        Rhythm: regular  Rate: normal                    Neuro/Psych:   (+) headaches: migraine headaches, depression/anxiety              ROS comment: History of Guillain-[place] syndrome - 1976    Right lumbar radiculopathy  GI/Hepatic/Renal:   (+) GERD:,           Endo/Other:    (+) hypothyroidism::., .                 Abdominal:             Vascular:   + DVT, . Other Findings:           Anesthesia Plan      TIVA     ASA 3       Induction: intravenous. Anesthetic plan and risks discussed with patient. Plan discussed with CRNA.     Attending anesthesiologist reviewed and agrees with Joshua Akhtar MD   7/6/2022

## 2022-07-06 NOTE — H&P
KATHLEEN Carrera is a 64 y.o. female Patient presents today for screening colonoscopy. Patient denies melena, denies hematochezia, denies abdominal or rectal pain. Patient states bowel habits are good, denies diarrhea or constipation. Good appetite, no unintentional weight loss noted. Denies N/V. Denies CP or SOB. Her last colonoscopy was 5 years ago and was normal.     Does  have a family history of colon cancer. Does not take blood thinners                Past Medical History:   Diagnosis Date    Abnormal CXR 1/14/13    Allergic rhinitis      Chronic pain      Depression      Dizziness      [facility] 1976     LLE    Dyspnea      Fatigue      Frequency      GERD (gastroesophageal reflux disease)       controlled with med    Guillain Barré syndrome ([address] 08/22/2016     onset 1976    Hair loss      Headache      History of DVT (deep vein thrombosis) 8/20/2018    History of Guillain-[place] syndrome 1976     was paralyzed x year; had to learn to walk again    Hot flashes      Hyperlipidemia      Hypothyroidism      Insomnia      Irregular menstruation      Leg swelling      Lumbago      Neck pain      Night sweats      Obesity      HEATH (obstructive sleep apnea)       pt says she has not been tested- dr says she has it    Osteopenia      Osteopenia      Other ill-defined conditions(799.89)       guillian pooja    Pharyngitis      Plantar fasciitis      Postmenopausal disorder      Right lumbar radiculopathy      Sinusitis      Thromboembolus ([address] 2014     LLE    Tobacco abuse      URI (upper respiratory infection)      Venous stasis      Vitamin D deficiency      Vitamin D deficiency               Current Outpatient Medications   Medication Sig Dispense Refill    atorvastatin (LIPITOR) 40 mg tablet Take 1 Tablet by mouth daily.  90 Tablet 2    topiramate (TOPAMAX) 50 mg tablet Take 50 mg by mouth two (2) times a day.        ergocalciferol (ERGOCALCIFEROL) 1,250 mcg (50,000 unit) capsule TAKE 1 CAPSULE BY MOUTH EVERY WEEK        cholecalciferol (VITAMIN D3) (2,000 UNITS /50 MCG) cap capsule Take 1 Cap by mouth daily. 90 Cap 2    furosemide (LASIX) 20 mg tablet Take 1 Tab by mouth daily.        levothyroxine (SYNTHROID) 100 mcg tablet Take 1 Tab by mouth Daily (before breakfast). Indications: a condition with low thyroid hormone levels 90 Tab 3    omeprazole (PRILOSEC) 20 mg capsule Take 1 Cap by mouth daily. 90 Cap 3    cyclobenzaprine (FLEXERIL) 10 mg tablet Take 1 Tab by mouth nightly as needed for Muscle Spasm(s). 90 Tab 3    ALPRAZolam (XANAX) 0.5 mg tablet Take 1-2 Tabs by mouth nightly as needed for Anxiety. Max Daily Amount: 1 mg. Indications: and sleep 60 Tab 3    multivit-min/ferrous fumarate (MULTI VITAMIN PO) Take  by mouth.        cyanocobalamin (VITAMIN B-12) 1,000 mcg tablet Take 1,000 mcg by mouth daily.        calcium carbonate (TUMS ULTRA) 400 mg calcium (1,000 mg) chew Take 2 Tabs by mouth daily. 60 Tab 0    aspirin delayed-release 81 mg tablet Take  by mouth daily.        loratadine (CLARITIN) 10 mg tablet Take 10 mg by mouth nightly.        Xiidra 5 % dpet INSTILL 1 DROP IN BOTH EYES TWICE DAILY 12 HOURS APART        Lotemax SM 0.38 % drpg          phentermine (Adipex-P) 37.5 mg tablet Take 1 Tab by mouth every morning. Max Daily Amount: 37.5 mg. For weight loss (Patient not taking: Reported on 1/25/2022) 30 Tab 3    darifenacin (ENABLEX) 15 mg ER tablet Take 1 Tab by mouth daily.  Indications: urinary urgency, or the sudden urge to urinate 90 Tab 3    Insulin Needles, Disposable, 32 gauge x 5/32\" ndle Use with Victoza once daily (Patient not taking: Reported on 10/13/2021) 100 Pen Needle 5            Allergies   Allergen Reactions    Latex, Natural Rubber Hives    Codeine Nausea Only and Hives       Codeine Sulfate   Even taking phenergan as premedication it didn't help       Flu Vaccine 2011 (36 Mos+)(Pf) Other (comments)       Pt has Guillain [place]    Nucynta [Tapentadol] Nausea Only    Topamax [Topiramate] Other (comments)       Tingling all over            Past Surgical History:   Procedure Laterality Date    COLONOSCOPY N/A 8/26/2016     COL[facility] performed [facility] at [facility] ENDOSCOPY    HX BREAST BIOPSY Left      HX HEENT         tonsils    HX TRACHEOSTOMY         due to [address]    HX TUBAL LIGATION        VA BREAST SURGERY PROCEDURE UNLISTED Left       lumpectomy            Family History   Problem Relation Age of Onset    Hypertension Other      Diabetes Other      Heart Disease Other      Kidney Disease Other      Stroke Other      High Cholesterol Other      Other Other           arthritis    Breast Cancer Mother [de-identified]    Hypertension Mother      Stroke Mother           \"several\"    Diabetes Mother           insulin dep    Colon Cancer Mother      Kidney Disease Mother      Dementia Mother      Other Mother           multiple myeloma    Breast Cancer Maternal Aunt           in her 46s    Heart Disease Sister           on warfarin-eventually might need a pacemaker    Cancer Sister           lymphoma    Hypertension Sister      Breast Cancer Sister      Hypertension Sister      Breast Cancer Paternal Aunt        Social History            Tobacco Use    Smoking status: Former Smoker       Years: 35.00    Smokeless tobacco: Never Used    Tobacco comment: quit age 47   Substance Use Topics    Alcohol use: Yes       Alcohol/week: 6.0 standard drinks       Types: 6 Cans of beer per week       Comment: Drinks beer, amount not reviewed.         Review of Systems   A comprehensive review of systems was negative except for that written in the HPI.      Physical Exam  Visit Vitals  BP (!) 140/90   Pulse 75   Ht 5' (1.524 m)   Wt 192 lb (87.1 kg)   LMP 09/01/2013   SpO2 99%   BMI 37.50 kg/m²         Constitutional: Alert, oriented, cooperative patient in no acute distress; appears stated age    Eyes:Sclera are clear. EOMs intact  ENMT: no external lesions gross hearing normal; no obvious neck masses, no ear or lip lesions, nares normal  CV: RRR. Normal perfusion  Resp: No JVD. Breathing is  non-labored; no audible wheezing. GI: soft and non-distended     Musculoskeletal: unremarkable with normal function. No embolic signs or cyanosis. Neuro:  Oriented; moves all 4; no focal deficits  Psychiatric: normal affect and mood, no memory impairment        Labs:      Assessment/Plan:   Jackeline Cordon is a 64 y.o. female who has signs and symptoms consistent with need for screening colonoscopy. Patient verbalized understanding of importance for bowel prep.      1. Schedule screening colonoscopy        I discussed the patient's condition and treatment options with the patient. I discussed risks of colonoscopy in language the patient could understand including bleeding, infection, aspiration, perforation, medication reaction, need for further endoscopy or surgery, abscess, fistula, SBO, DVT, PE, heart attack, stroke, renal failure, respiratory failure, ventilatory dependence, and death. The patient voiced understanding of all this and all questions were answered. Alternatives to colonoscopy were discussed also and risks of the alternatives. The patient requested that we proceed with colonoscopy. Informed consent was obtained. A copy of this note is sent to the referring physician.       Fabiola Garza MD

## 2022-11-11 ENCOUNTER — HOSPITAL ENCOUNTER (OUTPATIENT)
Dept: MAMMOGRAPHY | Age: 62
Discharge: HOME | End: 2022-11-14
Payer: COMMERCIAL

## 2022-11-11 DIAGNOSIS — Z12.31 ENCOUNTER FOR SCREENING MAMMOGRAM FOR MALIGNANT NEOPLASM OF BREAST: ICD-10-CM

## 2022-11-11 PROCEDURE — 77067 SCR MAMMO BI INCL CAD: CPT

## 2023-02-23 RX ORDER — CYCLOBENZAPRINE HCL 10 MG
TABLET ORAL
Qty: 90 TABLET | OUTPATIENT
Start: 2023-02-23

## 2023-10-31 ENCOUNTER — TRANSCRIBE ORDERS (OUTPATIENT)
Dept: SCHEDULING | Age: 63
End: 2023-10-31

## 2023-10-31 DIAGNOSIS — Z12.31 SCREENING MAMMOGRAM FOR BREAST CANCER: Primary | ICD-10-CM

## 2023-11-27 ENCOUNTER — HOSPITAL ENCOUNTER (OUTPATIENT)
Dept: MAMMOGRAPHY | Age: 63
Discharge: HOME | End: 2023-11-30
Payer: COMMERCIAL

## 2023-11-27 VITALS — WEIGHT: 185 LBS | HEIGHT: 61 IN | BODY MASS INDEX: 34.93 KG/M2

## 2023-11-27 DIAGNOSIS — Z12.31 SCREENING MAMMOGRAM FOR BREAST CANCER: ICD-10-CM

## 2023-11-27 PROCEDURE — 77063 BREAST TOMOSYNTHESIS BI: CPT

## 2024-02-23 ENCOUNTER — HOSPITAL ENCOUNTER (EMERGENCY)
Age: 64
Discharge: HOME | End: 2024-02-23
Payer: COMMERCIAL

## 2024-02-23 ENCOUNTER — APPOINTMENT (OUTPATIENT)
Dept: ULTRASOUND IMAGING | Age: 64
End: 2024-02-23
Payer: COMMERCIAL

## 2024-02-23 VITALS
RESPIRATION RATE: 18 BRPM | BODY MASS INDEX: 35.3 KG/M2 | HEART RATE: 94 BPM | DIASTOLIC BLOOD PRESSURE: 85 MMHG | HEIGHT: 61 IN | OXYGEN SATURATION: 99 % | WEIGHT: 187 LBS | SYSTOLIC BLOOD PRESSURE: 139 MMHG | TEMPERATURE: 98.2 F

## 2024-02-23 DIAGNOSIS — M71.22 BAKER'S CYST, LEFT: Primary | ICD-10-CM

## 2024-02-23 LAB
ALBUMIN SERPL-MCNC: 3.6 G/DL (ref 3.2–4.6)
ALBUMIN/GLOB SERPL: 1 (ref 0.4–1.6)
ALP SERPL-CCNC: 136 U/L (ref 50–136)
ALT SERPL-CCNC: 48 U/L (ref 12–65)
ANION GAP SERPL CALC-SCNC: 0 MMOL/L (ref 2–11)
AST SERPL-CCNC: 15 U/L (ref 15–37)
BASOPHILS # BLD: 0 K/UL (ref 0–0.2)
BASOPHILS NFR BLD: 1 % (ref 0–2)
BILIRUB SERPL-MCNC: 0.7 MG/DL (ref 0.2–1.1)
BUN SERPL-MCNC: 22 MG/DL (ref 8–23)
CALCIUM SERPL-MCNC: 9.8 MG/DL (ref 8.3–10.4)
CHLORIDE SERPL-SCNC: 114 MMOL/L (ref 103–113)
CO2 SERPL-SCNC: 29 MMOL/L (ref 21–32)
CREAT SERPL-MCNC: 0.9 MG/DL (ref 0.6–1)
DIFFERENTIAL METHOD BLD: NORMAL
EOSINOPHIL # BLD: 0.1 K/UL (ref 0–0.8)
EOSINOPHIL NFR BLD: 1 % (ref 0.5–7.8)
ERYTHROCYTE [DISTWIDTH] IN BLOOD BY AUTOMATED COUNT: 12.7 % (ref 11.9–14.6)
GLOBULIN SER CALC-MCNC: 3.6 G/DL (ref 2.8–4.5)
GLUCOSE SERPL-MCNC: 100 MG/DL (ref 65–100)
HCT VFR BLD AUTO: 43.2 % (ref 35.8–46.3)
HGB BLD-MCNC: 14.5 G/DL (ref 11.7–15.4)
IMM GRANULOCYTES # BLD AUTO: 0 K/UL (ref 0–0.5)
IMM GRANULOCYTES NFR BLD AUTO: 0 % (ref 0–5)
LYMPHOCYTES # BLD: 2.5 K/UL (ref 0.5–4.6)
LYMPHOCYTES NFR BLD: 29 % (ref 13–44)
MCH RBC QN AUTO: 30.7 PG (ref 26.1–32.9)
MCHC RBC AUTO-ENTMCNC: 33.6 G/DL (ref 31.4–35)
MCV RBC AUTO: 91.3 FL (ref 82–102)
MONOCYTES # BLD: 0.5 K/UL (ref 0.1–1.3)
MONOCYTES NFR BLD: 6 % (ref 4–12)
NEUTS SEG # BLD: 5.4 K/UL (ref 1.7–8.2)
NEUTS SEG NFR BLD: 63 % (ref 43–78)
NRBC # BLD: 0 K/UL (ref 0–0.2)
PLATELET # BLD AUTO: 251 K/UL (ref 150–450)
PMV BLD AUTO: 9.8 FL (ref 9.4–12.3)
POTASSIUM SERPL-SCNC: 4.3 MMOL/L (ref 3.5–5.1)
PROT SERPL-MCNC: 7.2 G/DL (ref 6.3–8.2)
RBC # BLD AUTO: 4.73 M/UL (ref 4.05–5.2)
SODIUM SERPL-SCNC: 143 MMOL/L (ref 136–146)
WBC # BLD AUTO: 8.5 K/UL (ref 4.3–11.1)

## 2024-02-23 PROCEDURE — 99284 EMERGENCY DEPT VISIT MOD MDM: CPT

## 2024-02-23 PROCEDURE — 80053 COMPREHEN METABOLIC PANEL: CPT

## 2024-02-23 PROCEDURE — 93971 EXTREMITY STUDY: CPT

## 2024-02-23 PROCEDURE — 85025 COMPLETE CBC W/AUTO DIFF WBC: CPT

## 2024-02-23 ASSESSMENT — ENCOUNTER SYMPTOMS
NAUSEA: 0
ABDOMINAL PAIN: 0
COLOR CHANGE: 0
SHORTNESS OF BREATH: 0
VOMITING: 0
DIARRHEA: 0

## 2024-02-23 ASSESSMENT — LIFESTYLE VARIABLES
HOW MANY STANDARD DRINKS CONTAINING ALCOHOL DO YOU HAVE ON A TYPICAL DAY: PATIENT DOES NOT DRINK
HOW OFTEN DO YOU HAVE A DRINK CONTAINING ALCOHOL: NEVER

## 2024-02-23 ASSESSMENT — PAIN SCALES - GENERAL: PAINLEVEL_OUTOF10: 0

## 2024-02-23 ASSESSMENT — PAIN - FUNCTIONAL ASSESSMENT: PAIN_FUNCTIONAL_ASSESSMENT: 0-10

## 2024-02-23 NOTE — DISCHARGE INSTRUCTIONS
You were evaluated in the emergency department today for pain in your left leg and knee.    Ultrasound is negative for blood.    Overall blood work is reassuring without any emergent finding    Ultrasound does demonstrate a Baker's cyst behind her left knee.  This is likely what is causing your pain.    I have put in a referral for orthopedics to see if they can drain this for you if needed.    Recommend warm compresses to the back of your knee.    Contact your primary care provider to schedule follow-up within the next 1 to 2 weeks.    Return to the emergency department for new or worsening chest pain, shortness of breath, signs and symptoms of stroke as listed in your discharge paper

## 2024-02-23 NOTE — ED TRIAGE NOTES
Pt ambulatory to triage c/o left knee pain and swelling xs 1 day. Pt denies redness or heat to the area or mechanical injury. Pt reports HX of DVT.

## 2024-02-23 NOTE — ED NOTES
I have reviewed discharge instructions with the patient.  The patient verbalized understanding.    Patient left ED via Discharge Method: ambulatory to Home with self.    Opportunity for questions and clarification provided.       Patient given 0 scripts.         To continue your aftercare when you leave the hospital, you may receive an automated call from our care team to check in on how you are doing.  This is a free service and part of our promise to provide the best care and service to meet your aftercare needs.” If you have questions, or wish to unsubscribe from this service please call [phone].  Thank you for Choosing our [facility] Emergency Department.        Marry Chavez LPN  02/23/24 [id number]

## 2024-02-23 NOTE — ED PROVIDER NOTES
Emergency Department Provider Note       PCP: No primary care provider on file.   Age: 63 y.o.   Sex: female     DISPOSITION Decision To Discharge 02/23/2024 01:21:41 PM       ICD-10-CM    1. Baker's cyst, left  M71.22 [facility]          Medical Decision Making     Vital signs reviewed, patient stable, NAD, afebrile, nontoxic in appearance     In summary this is a well-appearing 63-year-old female who presents to the emergency department today with chief complaint of pain and swelling to her left knee.  Patient noted swelling last night.  Denies any injury or trauma.  States she is concerned because she had a recent road trip to [place] and had a history of a provoked DVT.  Denies any other complaints such as chest pain or shortness of breath.  Denies any fevers.    Overall physical exam is reassuring.  Patient has normal active range of motion of her left knee.  There is no erythema, no induration, no heat.  There is no tenderness to palpation to the knee with the exception of some tenderness to popliteal fossa.  Patient also endorses some tenderness to proximal aspect of left calf.  There are no skin changes to the left calf, no accessory veins.  No lower extremity edema.    Will obtain some basic lab work in case patient needs to be anticoagulated.  Will obtain an ultrasound of left lower extremity to rule out DVT.    Overall lab work is reassuring without any emergent findings.  See ED course    Ultrasound today is negative for DVT, there is a 4.2 x 0.9 x 2.1 cm Baker's cyst at the left popliteal fossa.    Based on history, physical exam, work-up today in the emergency department, I do not feel additional lab work or imaging is warranted at this time.  No emergent findings.  Patient is stable and can be discharged home with follow-up to primary care and orthopedics.  Workup today is consistent with Baker's cyst.  There is no evidence of septic arthritis/septic joint.  No

## 2024-03-20 ENCOUNTER — OFFICE VISIT (OUTPATIENT)
Dept: ORTHOPEDIC SURGERY | Age: 64
End: 2024-03-20
Payer: COMMERCIAL

## 2024-03-20 VITALS — HEIGHT: 61 IN | BODY MASS INDEX: 36.1 KG/M2 | WEIGHT: 191.2 LBS

## 2024-03-20 DIAGNOSIS — G89.29 CHRONIC BILATERAL LOW BACK PAIN, UNSPECIFIED WHETHER SCIATICA PRESENT: ICD-10-CM

## 2024-03-20 DIAGNOSIS — M54.50 CHRONIC BILATERAL LOW BACK PAIN, UNSPECIFIED WHETHER SCIATICA PRESENT: ICD-10-CM

## 2024-03-20 DIAGNOSIS — M25.562 ACUTE PAIN OF LEFT KNEE: Primary | ICD-10-CM

## 2024-03-20 PROCEDURE — 99203 OFFICE O/P NEW LOW 30 MIN: CPT | Performed by: ORTHOPAEDIC SURGERY

## 2024-03-20 NOTE — PROGRESS NOTES
Right knee :ROM is 0 to 125 There is no obvious effusion.    Patient is neurologically intact distally. Skin is intact.       Imaging:   Radiographs:    An AP standing, skiers, flexion lateral, and sunrise view of the left knee was obtained  This demonstrated  No significant joint space narrowing or obvious issue.    Radiographic impression: normal left Knee    Assessment:   Reported Baker's cyst on an ultrasound obtained to rule out DVT.  This appears to be an incidental finding I do not believe she has significant or consistent symptomatology related to her left knee at this time.  We discussed the etiology of a popliteal cyst and the possibility of a degenerative meniscal tear that can lead to this.  She does not have consistent symptomatology related to meniscal injury and I do not think at this time an MRI or further evaluation is necessary.    We talked about the role her back and have in lower extremity discomfort and tightness in the hamstrings with protracted riding etc.  We talked about further measures that could be taken.  At this time I do not think further intervention is to be taken to her knee and I will see her back as needed.    Plan:     Follow up:   Return for As needed.     HAIDER MACEDO MD        Elements of this note have been dictated using speech recognition software. As a result, errors of speech recognition may have occurred.

## 2025-02-12 NOTE — PROGRESS NOTES
VSS at discharge. No complaints noted. Education reviewed and signed with patient and her . Pt discharged via wheelchair by Hudson Nguyễn RN. Patient to be driven home by her , Sybil Goodell. Patent

## 2025-08-19 ENCOUNTER — HOSPITAL ENCOUNTER (OUTPATIENT)
Dept: MAMMOGRAPHY | Age: 65
Discharge: HOME | End: 2025-08-22
Payer: COMMERCIAL

## 2025-08-19 VITALS — WEIGHT: 185 LBS | BODY MASS INDEX: 34.93 KG/M2 | HEIGHT: 61 IN

## 2025-08-19 DIAGNOSIS — Z12.31 VISIT FOR SCREENING MAMMOGRAM: ICD-10-CM

## 2025-08-19 PROCEDURE — 77063 BREAST TOMOSYNTHESIS BI: CPT

## 2025-08-28 ENCOUNTER — HOSPITAL ENCOUNTER (OUTPATIENT)
Dept: MAMMOGRAPHY | Age: 65
Discharge: HOME | End: 2025-08-31
Payer: COMMERCIAL

## 2025-08-28 DIAGNOSIS — R92.8 ABNORMAL SCREENING MAMMOGRAM: ICD-10-CM

## 2025-08-28 PROCEDURE — G0279 TOMOSYNTHESIS, MAMMO: HCPCS

## 2025-08-28 PROCEDURE — 76642 ULTRASOUND BREAST LIMITED: CPT

## (undated) DEVICE — YANKAUER,BULB TIP,W/O VENT,RIGID,STERILE: Brand: MEDLINE

## (undated) DEVICE — THE TORRENT IRRIGATION TUBING IS INTENDED TO PROVIDE IRRIGATION VIA IRRIGATION FLUIDS, SUCH AS STERILE WATER, DURING GASTROINTESTINAL ENDOSCOPIC PROCEDURES WHEN USED IN CONJUNCTION WITH AN IRRIGATION PUMP OR ELECTROSURGICAL UNIT.: Brand: TORRENT

## (undated) DEVICE — GAUZE,SPONGE,4"X4",12PLY,WOVEN,NS,LF: Brand: MEDLINE

## (undated) DEVICE — SYRINGE MED 3ML CLR PLAS STD N CTRL LUERLOCK TIP DISP

## (undated) DEVICE — SYRINGE MED 10ML LUERLOCK TIP W/O SFTY DISP

## (undated) DEVICE — KENDALL RADIOLUCENT FOAM MONITORING ELECTRODE RECTANGULAR SHAPE: Brand: KENDALL

## (undated) DEVICE — CANNULA NSL ORAL AD FOR CAPNOFLEX CO2 O2 AIRLFE

## (undated) DEVICE — SYRINGE, LUER SLIP, STERILE, 60ML: Brand: MEDLINE

## (undated) DEVICE — AIRLIFE™ OXYGEN TUBING 7 FEET (2.1 M) CRUSH RESISTANT OXYGEN TUBING, VINYL TIPPED: Brand: AIRLIFE™

## (undated) DEVICE — LUBE JELLY FOIL PACK 1.4 OZ: Brand: MEDLINE INDUSTRIES, INC.

## (undated) DEVICE — NEEDLE SYR 18GA L1.5IN RED PLAS HUB S STL BLNT FILL W/O

## (undated) DEVICE — CONNECTOR TBNG OD5-7MM O2 END DISP

## (undated) DEVICE — SINGLE PORT MANIFOLD: Brand: NEPTUNE 2